# Patient Record
Sex: FEMALE | Race: OTHER
[De-identification: names, ages, dates, MRNs, and addresses within clinical notes are randomized per-mention and may not be internally consistent; named-entity substitution may affect disease eponyms.]

---

## 2019-07-24 PROBLEM — Z00.00 ENCOUNTER FOR PREVENTIVE HEALTH EXAMINATION: Status: ACTIVE | Noted: 2019-07-24

## 2021-12-02 ENCOUNTER — APPOINTMENT (OUTPATIENT)
Dept: GASTROENTEROLOGY | Facility: CLINIC | Age: 71
End: 2021-12-02
Payer: MEDICARE

## 2021-12-02 DIAGNOSIS — Z85.05 PERSONAL HISTORY OF MALIGNANT NEOPLASM OF LIVER: ICD-10-CM

## 2021-12-02 DIAGNOSIS — Z86.19 PERSONAL HISTORY OF OTHER INFECTIOUS AND PARASITIC DISEASES: ICD-10-CM

## 2021-12-02 DIAGNOSIS — Z86.79 PERSONAL HISTORY OF OTHER DISEASES OF THE CIRCULATORY SYSTEM: ICD-10-CM

## 2021-12-02 DIAGNOSIS — Z78.9 OTHER SPECIFIED HEALTH STATUS: ICD-10-CM

## 2021-12-02 DIAGNOSIS — Z12.11 ENCOUNTER FOR SCREENING FOR MALIGNANT NEOPLASM OF COLON: ICD-10-CM

## 2021-12-02 DIAGNOSIS — Z80.0 FAMILY HISTORY OF MALIGNANT NEOPLASM OF DIGESTIVE ORGANS: ICD-10-CM

## 2021-12-02 DIAGNOSIS — R10.13 EPIGASTRIC PAIN: ICD-10-CM

## 2021-12-02 PROCEDURE — 99204 OFFICE O/P NEW MOD 45 MIN: CPT | Mod: 95

## 2021-12-02 PROCEDURE — 99214 OFFICE O/P EST MOD 30 MIN: CPT | Mod: 95

## 2021-12-02 RX ORDER — TELMISARTAN 20 MG/1
TABLET ORAL
Refills: 0 | Status: ACTIVE | COMMUNITY

## 2021-12-02 RX ORDER — OMEPRAZOLE 40 MG/1
40 CAPSULE, DELAYED RELEASE ORAL
Refills: 0 | Status: ACTIVE | COMMUNITY

## 2021-12-02 NOTE — ASSESSMENT
[FreeTextEntry1] : 71 year old female pt increased risk for CRC, HTN, chronic hepatitis B on tenofovir for 6 years, had developed HCC before I saw her, and was being [place] in [facility] [place] with [place] resection followed by TACE and RFA, then lung mets s/p immunotherapy until almost full resolution, currently she is awaiting MRI to confirm suspicious finding s in the liver in terms of possible recurrence.  \par No weight loss, blood in the stool, abdominal pain, or UGI sx. \par Now she presents for screening colonoscopy, last colonoscopy was 13 years ago. \par She reports epigastric pain for which she takes PPI and does not know why. \par \par Increased risk for CRC\par needs screening colonoscopy \par \par Epigastric pain, on PPI, contemplating to [place]\par will get an EGD\par Risks and benefits discussed with patient.\par \par CHB, on tenofovir, in remission\par continue meds\par \par Stage 4 HCC, in remission, follow up w [facility] [place]

## 2021-12-02 NOTE — HISTORY OF PRESENT ILLNESS
[Home] : at home, [unfilled] , at the time of the visit. [Medical Office: ([facility])___] : at the medical office located in  [Verbal consent obtained from patient] : the patient, [unfilled] [FreeTextEntry4] : Renita Campbell [de-identified] : 71 year old female pt increased risk for CRC, HTN, chronic hepatitis B on tenofovir for 6 years, had developed HCC before I saw her, and was being treated in [facility] [place] with surgical resection followed by TACE and RFA, then lung mets s/p immunotherapy until almost full resolution, currently she is awaiting MRI to confirm suspicious finding s in the liver in terms of possible recurrence.  \par No weight loss, blood in the stool, abdominal pain, or UGI sx. \par Now she presents for screening colonoscopy, last colonoscopy was 13 years ago. \par She reports epigastric pain for which she takes PPI and does not know why.

## 2021-12-02 NOTE — PHYSICAL EXAM
[General Appearance - Alert] : alert [Hearing Threshold Finger Rub Not [place]] : hearing was normal [] : no respiratory distress [Skin Color & Pigmentation] : normal skin color and pigmentation [Oriented To Time, Place, And Person] : oriented to person, place, and time

## 2023-03-24 RX ORDER — TENOFOVIR DISOPROXIL FUMARATE 300 MG/1
300 TABLET ORAL DAILY
Qty: 30 | Refills: 6 | Status: DISCONTINUED | COMMUNITY
Start: 2022-09-07 | End: 2023-03-24

## 2023-03-24 RX ORDER — TENOFOVIR DISOPROXIL FUMARATE 300 MG/1
300 TABLET ORAL DAILY
Qty: 30 | Refills: 6 | Status: DISCONTINUED | COMMUNITY
Start: 2021-08-26 | End: 2023-03-24

## 2023-03-24 RX ORDER — SODIUM PICOSULFATE, MAGNESIUM OXIDE, AND ANHYDROUS CITRIC ACID 10; 3.5; 12 MG/160ML; G/160ML; G/160ML
10-3.5-12 MG-GM LIQUID ORAL
Qty: 1 | Refills: 0 | Status: DISCONTINUED | COMMUNITY
Start: 2021-12-02 | End: 2023-03-24

## 2023-03-30 ENCOUNTER — APPOINTMENT (OUTPATIENT)
Dept: GASTROENTEROLOGY | Facility: CLINIC | Age: 73
End: 2023-03-30

## 2023-04-11 ENCOUNTER — APPOINTMENT (OUTPATIENT)
Dept: GASTROENTEROLOGY | Facility: CLINIC | Age: 73
End: 2023-04-11
Payer: MEDICARE

## 2023-04-11 DIAGNOSIS — Z12.11 ENCOUNTER FOR SCREENING FOR MALIGNANT NEOPLASM OF COLON: ICD-10-CM

## 2023-04-11 DIAGNOSIS — B18.1 CHRONIC VIRAL HEPATITIS B W/OUT DELTA-AGENT: ICD-10-CM

## 2023-04-11 PROCEDURE — 99214 OFFICE O/P EST MOD 30 MIN: CPT | Mod: 95

## 2023-04-11 NOTE — HISTORY OF PRESENT ILLNESS
[FreeTextEntry4] : Renita Campbell  [de-identified] : 72 year old female pt increased risk for CRC, HTN, chronic hepatitis B on tenofovir for 6 years, had developed HCC before I saw her, and was being [place] in [facility] [place] followed by TACE and RFA, then lung mets s/p immunotherapy until almost full resolution, currently she is in full remission ANTONIO. \par No weight loss, blood in the stool, abdominal pain, or UGI sx. \par Now she presents for screening colonoscopy.

## 2023-04-11 NOTE — ASSESSMENT
[FreeTextEntry1] : 72 year old female pt increased risk for CRC, HTN, chronic hepatitis B on tenofovir for 6 years, had developed HCC before I saw her, and was being [place] in [facility] [place] followed by TACE and RFA, then lung mets s/p immunotherapy until almost full resolution, currently she is in full remission ANTONIO. \par No weight loss, blood in the stool, abdominal pain, or UGI sx. \par Now she presents for screening colonoscopy.\par \par Increased risk for CRC\par needs screening colonoscopy \par Risks and benefits discussed with patient.\par \par CHB, on tenofovir, in remission\par continue meds\par \par Stage 4 HCC, in remission, follow up w [facility] [place]

## 2023-04-11 NOTE — PHYSICAL EXAM
[Alert] : alert [Sclera] : the sclera and conjunctiva were normal [Hearing Threshold Finger Rub Not [place]] : hearing was normal [Normal Appearance] : the appearance of the neck was normal [No Respiratory Distress] : no respiratory distress [Normal Color / Pigmentation] : normal skin color and pigmentation [Oriented To Time, Place, And Person] : oriented to person, place, and time

## 2023-05-09 ENCOUNTER — APPOINTMENT (OUTPATIENT)
Dept: GASTROENTEROLOGY | Facility: CLINIC | Age: 73
End: 2023-05-09

## 2023-09-07 PROBLEM — R19.4 FREQUENT BOWEL MOVEMENTS: Status: ACTIVE | Noted: 2023-09-07

## 2023-09-07 RX ORDER — TENOFOVIR DISOPROXIL FUMARATE 300 MG/1
300 TABLET ORAL DAILY
Qty: 30 | Refills: 6 | Status: DISCONTINUED | COMMUNITY
Start: 2023-03-09 | End: 2023-09-07

## 2023-09-07 RX ORDER — POLYETHYLENE GLYCOL 3350, SODIUM SULFATE, SODIUM CHLORIDE, POTASSIUM CHLORIDE, ASCORBIC ACID, SODIUM ASCORBATE 140-9-5.2G
140 KIT ORAL
Qty: 1 | Refills: 0 | Status: DISCONTINUED | COMMUNITY
Start: 2023-04-11 | End: 2023-09-07

## 2023-09-07 NOTE — REASON FOR VISIT
[Home] : at home, [unfilled] , at the time of the visit. [Medical Office: ([facility])___] : at the medical office located in  [Patient] : the patient [FreeTextEntry4] : Renita Campbell  [FreeTextEntry1] : Frequent BM

## 2023-09-11 ENCOUNTER — OUTPATIENT (OUTPATIENT)
Dept: OUTPATIENT SERVICES | Facility: HOSPITAL | Age: 73
LOS: 1 days | Discharge: HOME | End: 2023-09-11
Payer: MEDICARE

## 2023-09-11 ENCOUNTER — TRANSCRIPTION ENCOUNTER (OUTPATIENT)
Age: 73
End: 2023-09-11

## 2023-09-11 VITALS
DIASTOLIC BLOOD PRESSURE: 67 MMHG | HEIGHT: 62 IN | TEMPERATURE: 98 F | SYSTOLIC BLOOD PRESSURE: 157 MMHG | HEART RATE: 58 BPM | WEIGHT: 89.95 LBS

## 2023-09-11 VITALS
OXYGEN SATURATION: 100 % | DIASTOLIC BLOOD PRESSURE: 65 MMHG | RESPIRATION RATE: 18 BRPM | SYSTOLIC BLOOD PRESSURE: 164 MMHG | HEART RATE: 58 BPM

## 2023-09-11 DIAGNOSIS — Z98.890 OTHER SPECIFIED POSTPROCEDURAL STATES: Chronic | ICD-10-CM

## 2023-09-11 DIAGNOSIS — Z12.11 ENCOUNTER FOR SCREENING FOR MALIGNANT NEOPLASM OF COLON: ICD-10-CM

## 2023-09-11 DIAGNOSIS — R10.13 EPIGASTRIC PAIN: ICD-10-CM

## 2023-09-11 PROCEDURE — G0105: CPT

## 2023-09-11 RX ORDER — OMEPRAZOLE 10 MG/1
1 CAPSULE, DELAYED RELEASE ORAL
Refills: 0 | DISCHARGE

## 2023-09-11 RX ORDER — TENOFOVIR DISOPROXIL FUMARATE 300 MG/1
1 TABLET, FILM COATED ORAL
Refills: 0 | DISCHARGE

## 2023-09-11 RX ORDER — TELMISARTAN 20 MG/1
1 TABLET ORAL
Refills: 0 | DISCHARGE

## 2023-09-11 NOTE — ASU DISCHARGE PLAN (ADULT/PEDIATRIC) - CARE PROVIDER_API CALL
Aline Bonds  Gastroenterology  [address] [place] [address]  Phone: [phone]  Fax: [phone]  Follow Up Time:

## 2023-09-11 NOTE — CHART NOTE - NSCHARTNOTEFT_GEN_A_CORE
PACU ANESTHESIA ADMISSION NOTE      Procedure:   Post op diagnosis:      ____  Intubated  TV:______       Rate: ______      FiO2: ______    _x___  Patent Airway    _x___  Full return of protective reflexes    _x___  Full recovery from anesthesia / back to baseline status    Vitals:  T(C): 36.8 (09-11-23 @ 12:53), Max: 36.8 (09-11-23 @ 12:26)  HR: 58 (09-11-23 @ 14:30) (57 - 68)  BP: 164/65 (09-11-23 @ 14:30) (93/46 - 164/65)  RR: 18 (09-11-23 @ 14:30) (18 - 18)  SpO2: 100% (09-11-23 @ 14:30) (99% - 100%)    Mental Status:  _x___ Awake   _____ Alert   _____ Drowsy   _____ Sedated    Nausea/Vomiting:  _x___  NO       ______Yes,   See Post - Op Orders         Pain Scale (0-10):  __0___    Treatment: _x___ None    ____ See Post - Op/PCA Orders    Post - Operative Fluids:   __x__ Oral   ____ See Post - Op Orders    Plan: Discharge:   _x___Home       _____Floor     _____Critical Care    _____  Other:_________________    Comments:  No anesthesia issues or complications noted.  Discharge when criteria met.

## 2023-09-11 NOTE — H&P PST ADULT - HISTORY OF PRESENT ILLNESS
72 year old female pt increased risk for CRC, HTN, chronic hepatitis B on tenofovir for 6 years, had developed HCC before I saw her, and was being [place] in [facility] [place] with surgical resection followed by TACE and RFA, then lung mets s/p immunotherapy, high risk for CRC here for colon cancer screening

## 2023-09-11 NOTE — ASU DISCHARGE PLAN (ADULT/PEDIATRIC) - NS MD DC FALL RISK RISK
For information on Fall & Injury Prevention, visit: [email or web address] OR  [email or web address] OR  https://www.cdc.gov/steadi/patient.html

## 2023-09-11 NOTE — H&P PST ADULT - ASSESSMENT
72 year old female pt increased risk for CRC, HTN, chronic hepatitis B on tenofovir for 6 years, had developed HCC before I saw her, and [place] [facility] [place] by TACE and RFA, then lung mets s/p immunotherapy, high risk for CRC here for colon cancer screening

## 2023-09-12 ENCOUNTER — APPOINTMENT (OUTPATIENT)
Dept: GASTROENTEROLOGY | Facility: CLINIC | Age: 73
End: 2023-09-12

## 2023-09-12 DIAGNOSIS — R19.4 CHANGE IN BOWEL HABIT: ICD-10-CM

## 2023-09-12 PROBLEM — C34.90 MALIGNANT NEOPLASM OF UNSPECIFIED PART OF UNSPECIFIED BRONCHUS OR LUNG: Chronic | Status: ACTIVE | Noted: 2023-09-11

## 2023-09-12 PROBLEM — C22.9 MALIGNANT NEOPLASM OF LIVER, NOT SPECIFIED AS PRIMARY OR SECONDARY: Chronic | Status: ACTIVE | Noted: 2023-09-11

## 2023-09-13 DIAGNOSIS — K57.30 DIVERTICULOSIS OF LARGE INTESTINE WITHOUT PERFORATION OR ABSCESS WITHOUT BLEEDING: ICD-10-CM

## 2023-09-13 DIAGNOSIS — Z12.11 ENCOUNTER FOR SCREENING FOR MALIGNANT NEOPLASM OF COLON: ICD-10-CM

## 2023-09-13 DIAGNOSIS — B18.1 CHRONIC VIRAL HEPATITIS B WITHOUT DELTA-AGENT: ICD-10-CM

## 2023-09-13 DIAGNOSIS — K64.4 RESIDUAL HEMORRHOIDAL SKIN TAGS: ICD-10-CM

## 2023-09-13 DIAGNOSIS — I10 ESSENTIAL (PRIMARY) HYPERTENSION: ICD-10-CM

## 2023-09-13 DIAGNOSIS — Z85.118 PERSONAL HISTORY OF OTHER MALIGNANT NEOPLASM OF BRONCHUS AND LUNG: ICD-10-CM

## 2023-09-13 DIAGNOSIS — Z85.05 PERSONAL HISTORY OF MALIGNANT NEOPLASM OF LIVER: ICD-10-CM

## 2024-04-01 RX ORDER — TENOFOVIR DISOPROXIL FUMARATE 300 MG/1
300 TABLET ORAL DAILY
Qty: 30 | Refills: 6 | Status: ACTIVE | COMMUNITY
Start: 2023-04-11 | End: 1900-01-01

## 2024-09-10 ENCOUNTER — APPOINTMENT (OUTPATIENT)
Dept: GASTROENTEROLOGY | Facility: CLINIC | Age: 74
End: 2024-09-10
Payer: MEDICARE

## 2024-09-10 VITALS — HEIGHT: 62 IN | BODY MASS INDEX: 17.48 KG/M2 | WEIGHT: 95 LBS

## 2024-09-10 DIAGNOSIS — B18.1 CHRONIC VIRAL HEPATITIS B W/OUT DELTA-AGENT: ICD-10-CM

## 2024-09-10 DIAGNOSIS — Z12.11 ENCOUNTER FOR SCREENING FOR MALIGNANT NEOPLASM OF COLON: ICD-10-CM

## 2024-09-10 DIAGNOSIS — K59.00 CONSTIPATION, UNSPECIFIED: ICD-10-CM

## 2024-09-10 PROCEDURE — 99214 OFFICE O/P EST MOD 30 MIN: CPT

## 2024-09-10 RX ORDER — SODIUM SULFATE, POTASSIUM SULFATE AND MAGNESIUM SULFATE 1.6; 3.13; 17.5 G/177ML; G/177ML; G/177ML
17.5-3.13-1.6 SOLUTION ORAL
Qty: 1 | Refills: 0 | Status: ACTIVE | COMMUNITY
Start: 2024-09-10 | End: 1900-01-01

## 2024-09-10 NOTE — PHYSICAL EXAM
[Alert] : alert [Normal Voice/Communication] : normal voice/communication [No Acute Distress] : no acute distress [Well Developed] : well developed [Underweight (BMI <= 18.5)] : underweight (BMI <= 18.5) [Sclera] : the sclera and conjunctiva were normal [Hearing Threshold Finger Rub Not [place]] : hearing was normal [Normal Lips/Gums] : the lips and gums were normal [Normal Appearance] : the appearance of the neck was normal [No Respiratory Distress] : no respiratory distress [No Acc Muscle Use] : no accessory muscle use [Respiration, Rhythm And Depth] : normal respiratory rhythm and effort [Auscultation Breath Sounds / Voice Sounds] : lungs were clear to auscultation bilaterally [Heart Rate And Rhythm] : heart rate was normal and rhythm regular [Normal S1, S2] : normal S1 and S2 [Bowel Sounds] : normal bowel sounds [Abdomen Tenderness] : non-tender [No Masses] : no abdominal mass palpated [Abdomen Soft] : soft [Normal Color / Pigmentation] : normal skin color and pigmentation [Abnormal Walk] : normal gait [Oriented To Time, Place, And Person] : oriented to person, place, and time

## 2024-09-10 NOTE — HISTORY OF PRESENT ILLNESS
[FreeTextEntry1] :  74 year old female pt increased risk for CRC, HTN, chronic hepatitis B on tenofovir for 7 years, had developed HCC before I saw her, and was being treated in [facility] [place] with surgical resection followed by TACE and RFA, then lung mets s/p immunotherapy until almost full resolution, currently she is in full remission ANTONIO. She is here for repeat colonoscopy since last colonoscopy 9/23 was fairly prepped in the right colon.   No weight loss, blood in the stool, abdominal pain, or UGI sx. She gets occasional constipation depending on her diet. She has about 1 BM q day but hard, small balls at times, sometimes with straining.

## 2024-09-10 NOTE — HISTORY OF PRESENT ILLNESS
[FreeTextEntry1] :  74 year old female pt increased risk for CRC, HTN, chronic hepatitis B on tenofovir for 7 years, had developed HCC before I saw her, and was being treated in [facility] [place] with surgical resection followed by TACE and RFA, then lung mets s/p immunotherapy until almost full resolution, currently she is in full remission ANTONIO. She is here for repeat colonoscopy since last colonoscopy 9/23 was fairly prepped in the right colon.   No weight loss, blood in the stool, abdominal pain, or UGI sx. She gets occasional constipation depending on her diet. She has about 1 BM q day but hard, small balls at times, sometimes with straining.

## 2024-09-10 NOTE — ASSESSMENT
[FreeTextEntry1] :  74 year old female pt increased risk for CRC, HTN, chronic hepatitis B on tenofovir for 7 years, had developed HCC before I saw her, and [place] [facility] [place] by TACE and RFA, then lung mets s/p immunotherapy until almost full resolution, currently she is in full remission ANTONIO. She is here for repeat colonoscopy since last colonoscopy 9/23 was fairly prepped in the right colon.   No weight loss, blood in the stool, abdominal pain, or UGI sx. She gets occasional constipation depending on her diet. She has about 1 BM q day but hard, small balls at times, sometimes with straining.        Increased risk for CRC needs repeat colonoscopy since colonoscopy 9/23 was a fair prep Risks and benefits discussed with patient. She was told to take colace 100 mg 3 tabs daily x 2 weeks before the colonoscopy and to add senokot 2 tabs at hs if indicated to ensure regular BMs q day     CHB, on tenofovir, in remission  check Hep B PCR, cbc, cmp continue tenofovir (has refills) Pt to call for results   Stage 4 HCC, in remission, [place] [facility] [place]

## 2024-09-10 NOTE — ASSESSMENT
[FreeTextEntry1] :  74 year old female pt increased risk for CRC, HTN, chronic hepatitis B on tenofovir for 7 years, had developed HCC before I saw her, and was being [place] in [facility] [place] followed by TACE and RFA, then lung mets s/p immunotherapy until almost full resolution, currently she is in full remission ANTONIO. She is here for repeat colonoscopy since last colonoscopy 9/23 was fairly prepped in the right colon.   No weight loss, blood in the stool, abdominal pain, or UGI sx. She gets occasional constipation depending on her diet. She has about 1 BM q day but hard, small balls at times, sometimes with straining.        Increased risk for CRC needs repeat colonoscopy since colonoscopy 9/23 was a fair prep Risks and benefits discussed with patient. She was told to take colace 100 mg 3 tabs daily x 2 weeks before the colonoscopy and to add senokot 2 tabs at hs if indicated to ensure regular BMs q day     CHB, on tenofovir, in remission  check Hep B PCR, cbc, cmp continue tenofovir (has refills) Pt to call for results   Stage 4 HCC, in remission, follow up w [facility] [place].

## 2024-09-10 NOTE — PHYSICAL EXAM
[Alert] : alert [Normal Voice/Communication] : normal voice/communication [No Acute Distress] : no acute distress [Well Developed] : well developed [Underweight (BMI <= 18.5)] : underweight (BMI <= 18.5) [Sclera] : the sclera and conjunctiva were normal [Hearing Threshold Finger Rub Not [place]] : hearing was normal [Normal Lips/Gums] : the lips and gums were normal [Normal Appearance] : the appearance of the neck was normal [No Respiratory Distress] : no respiratory distress [No Acc Muscle Use] : no accessory muscle use [Respiration, Rhythm And Depth] : normal respiratory rhythm and effort [Heart Rate And Rhythm] : heart rate was normal and rhythm regular [Auscultation Breath Sounds / Voice Sounds] : lungs were clear to auscultation bilaterally [Normal S1, S2] : normal S1 and S2 [Bowel Sounds] : normal bowel sounds [Abdomen Tenderness] : non-tender [No Masses] : no abdominal mass palpated [Abdomen Soft] : soft [Normal Color / Pigmentation] : normal skin color and pigmentation [Abnormal Walk] : normal gait [Oriented To Time, Place, And Person] : oriented to person, place, and time

## 2024-12-01 ENCOUNTER — NON-APPOINTMENT (OUTPATIENT)
Age: 74
End: 2024-12-01

## 2024-12-05 ENCOUNTER — NON-APPOINTMENT (OUTPATIENT)
Age: 74
End: 2024-12-05

## 2025-09-19 ENCOUNTER — RX RENEWAL (OUTPATIENT)
Age: 75
End: 2025-09-19